# Patient Record
Sex: FEMALE | Employment: UNEMPLOYED | URBAN - METROPOLITAN AREA
[De-identification: names, ages, dates, MRNs, and addresses within clinical notes are randomized per-mention and may not be internally consistent; named-entity substitution may affect disease eponyms.]

---
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
## 2025-07-17 ENCOUNTER — VIRTUAL VISIT (OUTPATIENT)
Dept: FAMILY MEDICINE | Facility: CLINIC | Age: 20
End: 2025-07-17
Payer: COMMERCIAL

## 2025-07-17 DIAGNOSIS — L60.8 CHANGE IN NAIL APPEARANCE: ICD-10-CM

## 2025-07-17 DIAGNOSIS — F41.9 ANXIETY: Primary | ICD-10-CM

## 2025-07-17 RX ORDER — HYDROXYZINE PAMOATE 25 MG/1
25 CAPSULE ORAL 3 TIMES DAILY PRN
Qty: 90 CAPSULE | Refills: 0 | Status: SHIPPED | OUTPATIENT
Start: 2025-07-17

## 2025-07-17 RX ORDER — MIRTAZAPINE 7.5 MG/1
7.5 TABLET, FILM COATED ORAL DAILY
Qty: 30 TABLET | Refills: 0 | Status: SHIPPED | OUTPATIENT
Start: 2025-07-17

## 2025-07-17 NOTE — PROGRESS NOTES
Virginia is a 19 year old who is being evaluated via a billable video visit.    How would you like to obtain your AVS? MyChart  If the video visit is dropped, the invitation should be resent by: Text to cell phone: [phone].  Will anyone else be joining your video visit? No      Assessment & Plan   Problem List Items Addressed This Visit    None  Visit Diagnoses         Anxiety    -  Primary    Relevant Medications    hydrOXYzine saundra (VISTARIL) 25 MG capsule    mirtazapine (REMERON) 7.5 MG tablet      Change in nail appearance        Relevant Orders    Adult Dermatology  Referral             Follow-up       Subjective   Virginia is a 19 year old, presenting for the following health issues:    19 yr old female with history of anxiety. She noticed a dark spot underneath her nail about 1-2 weeks ago. She does not recall any trauma. It is not painful. It is not changing. She is concerned about this.   Also wants refills on her medications.   Toenail (Has a small black spot under her left great toenail.  This has been for about 1-2 weeks now.  There is no pain, bleeding or draining.  Just appeared one day as she doesn't remember any injury.  No previous issues like this.) and Refill Request (Wanting to discuss about some refills.  Wanting to know if the MD today can refill any or if she needs to follow with the ordering MD.  Bactroban ointment-uses this for a cyst inside the nasal area.  Flonase-her bottle is old and is requesting a new bottle.  Remeron and Lamotrigine-states she is out of these Rx's. )        7/17/2025     2:12 PM   Additional Questions   Roomed by Hamida William CMA   Accompanied by Self     Video Start Time: 2:15PM    Toenail          Chief Complaint   Patient presents with    Toenail     Has a small black spot under her left great toenail.  This has been for about 1-2 weeks now.  There is no pain, bleeding or draining.  Just appeared one day as she doesn't remember any injury.  No previous  issues like this.    Refill Request     Wanting to discuss about some refills.  Wanting to know if the MD today can refill any or if she needs to follow with the ordering MD.  Bactroban ointment-uses this for a cyst inside the nasal area.  Flonase-her bottle is old and is requesting a new bottle.  Remeron and Lamotrigine-states she is out of these Rx's.                Review of Systems  Constitutional, HEENT, cardiovascular, pulmonary, gi and gu systems are negative, except as otherwise noted.      Objective    Vitals - Patient Reported  Weight (Patient Reported): 54 kg (119 lb)  Temperature (Patient Reported): 97  F (36.1  C)  Pain Score: No Pain (0)        Physical Exam   GENERAL: alert and no distress  EYES: Eyes grossly normal to inspection.  No discharge or erythema, or obvious scleral/conjunctival abnormalities.  RESP: No audible wheeze, cough, or visible cyanosis.    SKIN: hyperpigmentation - nail left great toe  NEURO: Cranial nerves grossly intact.  Mentation and speech appropriate for age.  PSYCH: Appropriate affect, tone, and pace of words          Video-Visit Details    Type of service:  Video Visit   Video End Time:2:32PM  Originating Location (pt. Location):   Distant Location (provider location):  On-site  Platform used for Video Visit: Sheldon  Signed Electronically by: Ziggy Gross MD

## 2025-08-06 ENCOUNTER — VIRTUAL VISIT (OUTPATIENT)
Dept: FAMILY MEDICINE | Facility: CLINIC | Age: 20
End: 2025-08-06
Payer: COMMERCIAL

## 2025-08-06 DIAGNOSIS — F41.9 ANXIETY: Primary | ICD-10-CM

## 2025-08-06 DIAGNOSIS — R10.32 LLQ ABDOMINAL PAIN: ICD-10-CM

## 2025-08-06 DIAGNOSIS — K08.89 PAIN, DENTAL: ICD-10-CM

## 2025-08-06 PROCEDURE — 98006 SYNCH AUDIO-VIDEO EST MOD 30: CPT | Performed by: NURSE PRACTITIONER

## 2025-08-06 RX ORDER — HYDROXYZINE PAMOATE 25 MG/1
25 CAPSULE ORAL 3 TIMES DAILY PRN
Qty: 90 CAPSULE | Refills: 1 | Status: SHIPPED | OUTPATIENT
Start: 2025-08-06

## 2025-08-06 RX ORDER — NAPROXEN 500 MG/1
500 TABLET ORAL 2 TIMES DAILY WITH MEALS
Qty: 60 TABLET | Refills: 1 | Status: SHIPPED | OUTPATIENT
Start: 2025-08-06 | End: 2025-10-05

## 2025-08-06 RX ORDER — MIRTAZAPINE 7.5 MG/1
7.5 TABLET, FILM COATED ORAL DAILY
Qty: 90 TABLET | Refills: 1 | Status: SHIPPED | OUTPATIENT
Start: 2025-08-06

## 2025-08-20 ENCOUNTER — PATIENT OUTREACH (OUTPATIENT)
Dept: CARE COORDINATION | Facility: CLINIC | Age: 20
End: 2025-08-20
Payer: COMMERCIAL

## 2025-09-03 ENCOUNTER — PATIENT OUTREACH (OUTPATIENT)
Dept: CARE COORDINATION | Facility: CLINIC | Age: 20
End: 2025-09-03
Payer: COMMERCIAL